# Patient Record
Sex: MALE | Race: WHITE | URBAN - METROPOLITAN AREA
[De-identification: names, ages, dates, MRNs, and addresses within clinical notes are randomized per-mention and may not be internally consistent; named-entity substitution may affect disease eponyms.]

---

## 2017-12-04 ENCOUNTER — HISTORICAL (OUTPATIENT)
Dept: ENDOSCOPY | Facility: HOSPITAL | Age: 32
End: 2017-12-04

## 2017-12-11 ENCOUNTER — HISTORICAL (OUTPATIENT)
Dept: RADIOLOGY | Facility: HOSPITAL | Age: 32
End: 2017-12-11

## 2018-10-24 ENCOUNTER — HISTORICAL (OUTPATIENT)
Dept: ADMINISTRATIVE | Facility: HOSPITAL | Age: 33
End: 2018-10-24

## 2018-10-24 LAB
APTT PPP: 29.6 SECOND(S) (ref 23.3–37)
INR PPP: 1.03 (ref 0.9–1.2)
PROTHROMBIN TIME: 12.8 SECOND(S) (ref 11.9–14.4)
VALPROATE SERPL-MCNC: 134 MCG/ML (ref 50–100)

## 2018-10-25 ENCOUNTER — HISTORICAL (OUTPATIENT)
Dept: SURGERY | Facility: HOSPITAL | Age: 33
End: 2018-10-25

## 2019-02-13 ENCOUNTER — HISTORICAL (OUTPATIENT)
Dept: ADMINISTRATIVE | Facility: HOSPITAL | Age: 34
End: 2019-02-13

## 2019-05-16 ENCOUNTER — HISTORICAL (OUTPATIENT)
Dept: RADIOLOGY | Facility: HOSPITAL | Age: 34
End: 2019-05-16

## 2022-04-10 ENCOUNTER — HISTORICAL (OUTPATIENT)
Dept: ADMINISTRATIVE | Facility: HOSPITAL | Age: 37
End: 2022-04-10

## 2022-04-28 VITALS
SYSTOLIC BLOOD PRESSURE: 120 MMHG | DIASTOLIC BLOOD PRESSURE: 85 MMHG | BODY MASS INDEX: 40.65 KG/M2 | HEIGHT: 62 IN | OXYGEN SATURATION: 95 % | WEIGHT: 220.88 LBS

## 2022-04-30 NOTE — OP NOTE
Patient:   Mikel Leon            MRN: [id number]            FIN: [id number]               Age:   32 years     Sex:  Male     :  1985   Associated Diagnoses:   None   Author:   Andre Valencia MD      Procedure   Esophagogastroduodenoscopy procedure   Colonoscopy Procedure      Impression and Plan   DATE OF PROCEDURE:      PATIENT NAME: Mikel Leon    MRN: [id number]  PREOPERATIVE DIAGNOSIS:  GERD, BRBPR  POSTOPERATIVE DIAGNOSIS:  Gastritis, diverticulosis  PROCEDURE PERFORMED:  Esophagogastroduodenoscopy and Colonoscopy  ENDOSCOPIST:  Wesly Norton MD  ASSISTANT:  Andre Valencia MD  ANESTHESIA:  Deep Sedation  EBL: none  EXTENT OF EXAM:  To 3rd part duodenumand to cecum  LIMITATIONS:  None.  INDICATIONS FOR EXAMINATION:  The patient is a 33yo M Hx gastritis, epigastric pain, and BRBPR.  PROCEDURE IN DETAIL:  A physical examination was performed. The major risks and benefits associated with the procedure were explained to the patient in detail. The patient verbalized understanding and agreement with the same. Informed consent was obtained.  The patient was connected to the appropriate monitoring devices and an IV was started. Continuous oxygen was provided via nasal cannula and intravenous sedation was administered in divided doses throughout the procedure.   The patient was then placed in the left lateral decubitus position. The endoscope was then advanced under direct visualization over the tongue, the esophagus, stomach and duodenum. It was slowly withdrawn and the mucosa was carefully evaluated. Duodenal mucosal abnormalities were not visualized. Antegrade and retrograde views of the stomach did demonstrate some mild non-erosive gastritis, predominantly in the antrum. Cold forceps antral biopsy was taken. Retroflexed view demonstrated no signs of a gastric varix, no coffee grounds or red blood were seen in the gastric lumen. The scope was then withdrawn through the GE junction and careful  examination showed no abnormalities. Careful examination of the remainder of the esophagus appeared normal. The scope was then withdrawn from the patient and the procedure terminated. It was well tolerated and there were no immediate complications.     the table was turned 180 degrees and the colonscopy procedure began. A digital rectal exam was performed. This examination was within normal limits. A well-lubricated colonoscope was then inserted into the rectum and advanced under direct visualization to the level of the cecum. The cecum was identified by both visual and anatomic landmarks. A photograph was taken of the cecal cap, as well as the intussuscepting ileum and appendiceal oriface. The scope was then fully withdrawn while examining the color, texture, anatomy and integrity of the mucosa from the cecum to the anal canal. The findings were consistent with mild diverticulosis. The scope was then retroflexed to view the dentate line. No abnormalities. The scope was completely retrieved upon exiting the anal canal and the procedure was terminated. The patient was then transferred to the recovery room in stable condition.    ENDOSCOPIC DIAGNOSIS:  gastritis and diverticulosis  RECOMMENDATIONS:  Follow up for Bx results, repeat C-scope at 49 years of age.

## 2022-05-04 NOTE — HISTORICAL [facility] CERNER
This is a historical note converted from Melanie. Formatting and pictures may have been removed.  Please reference Melanie for original formatting and attached multimedia. Operative Note  Date of Service: 10 12/5/2018  Pre-Operative Diagnosis:?  1. ?Spiral tibial fracture with?intra-articular extension to the medial malleolus  Post-Operative Diagnosis:?Same  Procedure(s):?  1. ?ORIF?with intramedullary nail  2. ?Closed reduction percutaneous screw placement medial malleolus  Anesthesia:?General?  Attending:?Popeye Jean Baptiste MD, was present and scrubbed for the key portions of the procedure.?  Surgeon(s):  Pedro Perez  Indications  Patient is a 33-year-old male with history of his?fall from standing with a twisting injury to his?left lower extremity. The patient was checked again in the Holding Room. The risks, benefits, complications, treatment options, and expected outcomes were reviewed again with the patient. The patient has elected to proceed with open reduction internal fixation of?left tibia with intramedullary nail and pectineus screw placement of the medial malleolus. The risks and potential complications include but are not limited to infection, nerve injury, vascular injury, persistent pain, potential skin necrosis, deep vein thrombosis, possible pulmonary embolus, complications of the anesthetics and failure of the implants with potential need for future surgery to remove or revise. The patient concurred with the proposed plan, giving informed consent. The site of surgery was identified by the patient and properly noted/marked by me.  Implant:?  1.??Synthes suprapatellar nail?375mm x 10mm?with associated screws  2. ?2X 4.0 cannulated screws?medial mall  Procedure Details:?  After risks, benefits, and alternatives of the procedure were discussed in detail, informed consent was obtained from the patient. The patient was taken to the operating room, administered general anesthesia and placed in the  supine position. The left lower extremity was prepped and draped in the usual sterile fashion. A time out was performed: confirmation of the correct patient, operative side, operative site, site ashwin, allergies and the appropriate antibiotics were administered.?  Attention was first turned to the?medial mall?tibial?fracture. ?Using fluoroscopy to?K wires were placed?medial to lateral?perpendicular?to the joint line at the?pyseal?scar.? Using the?K wires and fluoroscopy?the appropriate length screw was determined.? 2?4.0 cannulated screws?were placed across the medial mall fracture fragment.? Adequate fracture reduction was confirmed with fluoroscopy.  Next our attention was turned to the tibial?fracture. ?Using?two point of?reduction Bonilla clamps?the tibial fracture was?reduced and clamped?through?4 small?poke hole incisions.. ?Adequate fracture reduction was confirmed with fluoroscopy. ?An incision was then made?superiorly?extending from the?superior pole of the patella.? The quad tendon was then?sharply incised in line with the fibers. Next, the silicone protective sleeve as well as the?positioning guide?was placed?and the?starting guide wire was placed just adjacent to the anterior proximal tibial articular surface. Positioning was confirmed on xray. The starting wire was advanced into the proximal tibia. The opening reamer was then used to open the tibial intramedullary canal. Next, a ball tip guide wire was advanced down the shaft of the tibia, across the fracture site, and into the distal segment (confirmed on xray). Next the size?8.5 mm opening reamer was used to ream the IM canal. ?Using sequential?reamers?the canal?was reamed?to a?11?mm and prepared?for insertion of the?nail.??A 10 mm?nail?was?carefully inserted?down to the level of the?physeal?scar,?reduction during nail insertion was confirmed on xray. ?Using a targeting guide two anteromedial screws were then used to locked the nail?proximally. The nail  was then locked distally using 2?direct?medial to lateral?and 1?lateral to medial oblique?screws?were placed?using?the perfect circles technique. The wounds were then copiously irrigated with normal saline. ?The quad tendon was closed using 0-0?Vicryl?and the subcutaneous tissue and skin?at the proximal tibia and suprapatellar were closed?using?3-0?Vicryl and staples. ?The distal tibial?incisions were closed using?2-0 nylon. ?Sterile dressings were applied.?He was then awakened from anesthesia, extubated and taken to the recovery room in a stable condition, having suffered no apparent untoward event.  A dry sterile dressing was applied using Adaptic, sterile gauze, cast padding, and loosely wrapped ace wrap.?  Instrument, sponge, and needle counts were correct prior to wound closure and at the conclusion of the case.?  The patient was awoken from anesthesia, tolerated the procedure well and taken to recovery in stable condition.  Findings:?Spiral tibial fracture with medial mall extension?intra-articular  Estimated blood loss:?50 cc  Drains:?None  Total IV fluids:?per anesthesia records  Specimens:?None  Complications:?None, pt tolerated the procedure well  Disposition:?Awakened from anesthesia, and taken to the recovery room in a stable condition, having suffered no apparent untoward event?  Condition:?Stable?  Post-Operative Management  1. Discharge to home when patient is appropriate  2. Adv diet as chiquita  3. ?Patient be nonweightbearing left lower extremity until clinic follow-up in 2 weeks. ?Patient is leave the dressing on clean dry and intact until that time.  ?RTC on 2 weeks for suture removal and transition to CAM boot.? Patient will be nonweightbearing left lower extremity for a total of 6 weeks from date of surgery?12/6/2018  Discharge Medications:?  Pain medication?  ?  Pedro Perez  [facility]  ??  ?   Dr. Jean Baptiste?was present with Dr. Perez?during all critical and key portions of the  procedure. I was also present as an assistant staff on this case.   I was present and either performed or observed all key portions of the procedure.  ?

## 2022-05-04 NOTE — HISTORICAL [facility] CERNER
This is a historical note converted from Melanie. Formatting and pictures may have been removed.  Please reference Melanie for original formatting and attached multimedia. Admission Information  33-year-old male?admitted to same day surgery on 10/25/2018?for operative fixation of a tibia fracture?with intra-articular extension?through the medial malleolus.? Patient tolerated the procedure very well?patient was transition to PACU in stable condition.  Hospital Course  Procedures and Treatment Provided  ORIF tibia with intramedullary nail  Discharge Plan  Patient to leave dressing on until clinic. ?We will see patient back in 2 weeks. ?Patient is to be strict elevation?as well as nonweightbearing?to the surgical?extremity.  Patient Discharge Condition  Stable condition  Discharge Disposition  Discharge home   Mikel Leon?s?medical history, post-op exam findings, diagnosis, and treatment outlined by?Dr. Perez?has been reviewed.??Treatment plan is determined to be reasonable and appropriate.?I was present during the evaluation.  ?

## 2022-05-04 NOTE — HISTORICAL [facility] CERNER
This is a historical note converted from Melanie. Formatting and pictures may have been removed.  Please reference Melanie for original formatting and attached multimedia. Chief Complaint  2 month clinic f/u  History of Present Illness  33-year-old male status post above procedure. ?He is here today for reevaluation. Still with some knee and leg pain.?Ambulates without CAM boot. Also complains of aches and pain throughout his LLE. Josette any fever or?chills.  Review of Systems  Constitutional:?no fever, fatigue, weakness  Respiratory:?no shortness of breath  Cardiovascular:?no chest pain  Gastrointestinal:?no nausea, vomiting, or diarrhea  Genitourinary:?no urinary retention  Neurologic: no dizziness  ?  Physical Exam  Vitals & Measurements  HT:?157?cm? WT:?100.2?kg? BMI:?40.65?  GEN: Well developed, well nourished  RESP: Equal chest rise bilaterally, no increased WOB  CV: Skin warm, well perfused  NEURO: AOx3, cooperative  ?   Left lower extremity:  Incisions well-healed  Knee range of motion 0-120. ?He can get to about 130 on the contralateral side  Ankle dorsiflexion to about 10 degrees past neutral  Quad and calf?atrophy?compared to contralateral side  Tenderness palpation over fracture site  ?   Imaging:  X-rays today demonstrate well-placed hardware. ?Appears to?have 3 cortices of?healing. ?Difficult to fully tell.  Assessment/Plan  Closed fracture of left tibia?S82.202A  Ordered:  XR Ankle Left Minimum 3 Views, Routine, 02/13/19 8:12:00 CST, Fracture, None, Ambulatory, Rad Type, Closed fracture of left tibia, Not Scheduled, 02/13/19 8:12:00 CST  XR Tibia-Fibula Left 2 Views, Routine, 02/13/19 8:12:00 CST, Fracture, None, Ambulatory, Rad Type, Closed fracture of left tibia, Not Scheduled, 02/13/19 8:12:00 CST  ?  33-year-old male?4 months status post?left tibia?IM nail and ankle ORIF.? Still some pain over the fracture site. ?Patient did have some delayed healing initially  - At this time?we will continue to for  him to be weightbearing as tolerated to his left lower extremity  -I believe this fracture is fully healed, however the fact that he is having some tenderness over the fracture site on the CT scan to ensure that he has bridging bone?across his fracture  -were CT scan of his left tibia, return to clinic after?CT scan is done  -if the CT scan shows that he has bridging bone, we will get him into therapy to work on motion and strengthening the left leg?  -if the?CT scan does not show any bridging bone?well have to determine at that time?to do some watchful waiting or?possibly some type of?bone graft  -We will also try to get him a bone stimulator   Problem List/Past Medical History  Ongoing  ADHD - Attention deficit disorder with hyperactivity  Anxiety  Bipolar  Chronic GERD  Chronic sinusitis  Compulsive behavior  Constipation  Depression  Fracture of tibia  Night terrors  Obesity  Schizophrenia  Historical  No qualifying data  Procedure/Surgical History  Intramedullary Tramaine Insertion Tibia (Left) (10/25/2018)  Biopsy Gastrointestinal (12/04/2017)  Colonoscopy (12/04/2017)  Esophagogastroduodenoscopy (12/04/2017)  tonsillectomy   Medications  ATORVASTATIN 10 MG TABLET, 10 mg= 1 tab(s), qPM  cetirizine 10 mg oral tablet, 10 mg= 1 tab(s), Oral, Daily, PRN  clomiPRAMINE 50 mg oral capsule, 50 mg= 1 cap(s), Oral, TID  DIVALPROEX SOD  MG TA  ESOMEPRAZOLE MAG DR 40 MG CAP, 40 mg= 1 cap(s), Oral, Daily  Flonase 50 mcg/inh nasal spray, 1 spray(s), Nasal, Daily  HYDROCODONE-ACETAMIN 7.5-325, 1 tab(s), Oral, q6hr,? ?Not taking  HYDROXYZINE CLARE 100 MG CAP, 100 mg= 1 cap(s), Oral, TID  IBUPROFEN 800 MG TABLET, 800 mg= 1 tab(s), Oral, TID  MiraLax (polyethylene glycol 3350), 17 gm, Oral, Daily  MIRTAZAPINE 30 MG TAB, 30 mg= 1 tab(s), Oral, qPM,? ?Not taking  montelukast 10 mg oral TABLET, 10 mg= 1 tab(s), Oral, Daily  MUPIROCIN 2 OINT, 1 lucio, TOP, TID  Ocean 0.65% nasal solution, 2 drop(s), Nasal,  q2hr  OXYCODONE-ACETAMINOPHEN , 1 tab(s), Oral, q4hr  paliperidone 6 mg oral tablet, extended release, See Instructions  QUETIAPINE 300MG TAB  ranitidine 150 mg oral tablet, See Instructions, 3 refills  ZALEPLON 10 MG CAPSULE, 10 mg= 1 cap(s), Oral, qPM  Allergies  penicillin?(rash)  Social History  Alcohol  Never, 10/18/2017  Employment/School  Unemployed, 02/13/2019  Exercise  Exercise duration: 0., 02/13/2019  Home/Environment  Lives with Mother, grand father., 02/13/2019  Nutrition/Health  Regular, 02/13/2019  Sexual  Gender Identity Identifies as male., 02/13/2019  Substance Abuse  Previous treatment: None., 10/18/2017  Tobacco  Never (less than 100 in lifetime), N/A, 02/13/2019  Family History  Coronary artery disease: Grandfather.  Diabetes mellitus type 2: Grandmother.  Health Maintenance  Health Maintenance  ???Pending?(in the next year)  ??? ??Due?  ??? ? ? ?Tetanus Vaccine due??02/13/19??and every 10??year(s)  ??? ??Due In Future?  ??? ? ? ?Blood Pressure Screening not due until??04/24/19??and every 1??year(s)  ???Satisfied?(in the past 1 year)  ??? ??Satisfied?  ??? ? ? ?ADL Screening on??02/13/19.??Satisfied by Guillaumelite Mariela F  ??? ? ? ?Alcohol Misuse Screening on??02/13/19.??Satisfied by Hypolite Mariela F  ??? ? ? ?Blood Pressure Screening on??10/25/18.??Satisfied by Padma Steele RN  ??? ? ? ?Body Mass Index Check on??02/13/19.??Satisfied by Hypolite Mariela F  ??? ? ? ?Depression Screening on??10/24/18.??Satisfied by Dinorah Hackett MA  ??? ? ? ?Obesity Screening on??02/13/19.??Satisfied by Hypolite Mariela F  ?  ?      Mikel Leon?s?medical history, post-op exam findings left leg / ankle, diagnosis, and treatment outlined by??Manolo has been reviewed.??Treatment plan is determined to be reasonable and appropriate.?I was present during the evaluation. X-rays left tibia / ankle?reviewed.  ?   XR L tibia: Approximately 6-7mm fx gap noted. Not fully healed. Fracture line still present.

## 2022-05-04 NOTE — HISTORICAL [facility] CERNER
This is a historical note converted from Melanie. Formatting and pictures may have been removed.  Please reference Melanie for original formatting and attached multimedia. Patient physical  ?   CC:?Left tibia fracture  ?   HPI:  3-year-old male sustained a left tibia fracture during a fall from standing about 2 weeks ago.? He was at home when this happened. ?He did not seek medical treatment for several days as he was not sure?it was necessary and was not sure it was broken.? Ultimately though his family took him to the doctor?and he was taken to several ERs?and ultimately?given an outpatient referral here.? There is no history of?prior fractures. ?He has had?understandable pain.? He does not have any medical problems and is an occasional smoker.  Past medical history segments above  Past surgical history as diagnosed above  Social history is documented above  Family history is?document the above  PE:  GEN: Well developed, well nourished  RESP: Equal chest rise bilaterally, no increased WOB  CV: Skin warm, well perfused  NEURO: AOx3, cooperative  ?   Left lower extremity:  Bruising about the entire lower leg.  No wounds seen  Compartments soft  No pain with passive toe stretch  Dorsalis pedis 1+  Sensation intact to light touch?sural, saphenous, tibial, superficial and deep peroneal nerves  Moving toes  ?  Imaging:  X-rays demonstrate?a left?tibia fracture with minimal displacement  ?  A/P:  33-year-old male with left tibia fracture  -To OR today for ORIF tibia  ?  There are no changes to the H&P documented above. ?Patient is in good health. ?Patient states understanding of the proposed procedure and recovery process and gave verbal consent to proceed with proposed surgery.  ?   Mikel Leon?s?medical history, pre-op exam findings left leg, diagnosis, and treatment outlined by?Dr. Perez?has been reviewed.??Treatment plan is determined to be reasonable and appropriate.?I was present during the evaluation.  ?